# Patient Record
Sex: MALE | Race: WHITE | NOT HISPANIC OR LATINO | Employment: OTHER | URBAN - METROPOLITAN AREA
[De-identification: names, ages, dates, MRNs, and addresses within clinical notes are randomized per-mention and may not be internally consistent; named-entity substitution may affect disease eponyms.]

---

## 2018-11-11 ENCOUNTER — OFFICE VISIT (OUTPATIENT)
Dept: URGENT CARE | Facility: CLINIC | Age: 33
End: 2018-11-11
Payer: COMMERCIAL

## 2018-11-11 VITALS
HEART RATE: 89 BPM | SYSTOLIC BLOOD PRESSURE: 160 MMHG | OXYGEN SATURATION: 97 % | DIASTOLIC BLOOD PRESSURE: 96 MMHG | RESPIRATION RATE: 16 BRPM | TEMPERATURE: 97.1 F | BODY MASS INDEX: 23.1 KG/M2 | WEIGHT: 180 LBS | HEIGHT: 74 IN

## 2018-11-11 DIAGNOSIS — B35.6 TINEA CRURIS: Primary | ICD-10-CM

## 2018-11-11 PROCEDURE — 99283 EMERGENCY DEPT VISIT LOW MDM: CPT | Performed by: PHYSICIAN ASSISTANT

## 2018-11-11 PROCEDURE — G0382 LEV 3 HOSP TYPE B ED VISIT: HCPCS | Performed by: PHYSICIAN ASSISTANT

## 2018-11-11 RX ORDER — CEPHALEXIN 500 MG/1
500 CAPSULE ORAL
COMMUNITY
End: 2018-12-26 | Stop reason: HOSPADM

## 2018-11-11 RX ORDER — LABETALOL 300 MG/1
300 TABLET, FILM COATED ORAL DAILY
COMMUNITY

## 2018-11-11 NOTE — PROGRESS NOTES
NAME: Fausto Hernandez is a 35 y o  male  : 1985    MRN: [id number]      Assessment and Plan   Tinea cruris [B35 6]  1  Tinea cruris  terbinafine (LamISIL) 1 % cream           Patient Instructions   Patient Instructions   Use cream as directed  Keep area clean and covered as it is contagious  Follow-up with dermatologist if no improvement in 5 days      Proceed to ER if symptoms worsen  History of Present Illness     Patient with recent diagnosis of hidradenitis presents complaining of a new rash to the inside of his left groin  He reports he noticed this 2 days ago and states that it itches but does not hurt  He has not tried anything for it  He states he is currently on Keflex for his hidradenitis  Denies any new sexual partners  Review of Systems   Review of Systems   Constitutional: Negative for chills and fever  Skin: Positive for rash  Current Medications       Current Outpatient Prescriptions:     cephalexin (KEFLEX) 500 mg capsule, Take 500 mg by mouth 3 (three) times a day, Disp: , Rfl:     labetalol (NORMODYNE) 300 mg tablet, Take 300 mg by mouth daily, Disp: , Rfl:     LORazepam (ATIVAN) 0 5 mg tablet, Take 1 tablet by mouth 3 (three) times a day as needed (Tremors and anxiety due to alcohol withdrawal) for up to 3 days, Disp: 9 tablet, Rfl: 0    terbinafine (LamISIL) 1 % cream, Apply topically 2 (two) times a day for 7 days, Disp: 30 g, Rfl: 0    Current Allergies     Allergies as of 2018    (No Known Allergies)              Past Medical History:   Diagnosis Date    Alcohol abuse        No past surgical history on file  No family history on file  Medications have been verified      The following portions of the patient's history were reviewed and updated as appropriate: allergies, current medications, past family history, past medical history, past social history, past surgical history and problem list     Objective   /96   Pulse 89   Temp (!) 97 1 °F (36 2 °C)   Resp 16   Ht 6' 2" (1 88 m)   Wt 81 6 kg (180 lb)   SpO2 97%   BMI 23 11 kg/m²      Physical Exam     Physical Exam   Constitutional: He appears well-developed and well-nourished  No distress  Skin:   3 cm round discrete area of erythematous scaling ring with central clearing consistent with tinea corpus in the crease of the left groin with similar lesion on the opposite area of crease

## 2018-11-11 NOTE — PATIENT INSTRUCTIONS
Use cream as directed  Keep area clean and covered as it is contagious  Follow-up with dermatologist if no improvement in 5 days

## 2018-12-03 ENCOUNTER — OFFICE VISIT (OUTPATIENT)
Dept: SURGERY | Facility: HOSPITAL | Age: 33
End: 2018-12-03
Payer: COMMERCIAL

## 2018-12-03 VITALS
BODY MASS INDEX: 24.07 KG/M2 | WEIGHT: 187.6 LBS | HEART RATE: 73 BPM | DIASTOLIC BLOOD PRESSURE: 105 MMHG | TEMPERATURE: 98.1 F | SYSTOLIC BLOOD PRESSURE: 163 MMHG | HEIGHT: 74 IN

## 2018-12-03 DIAGNOSIS — D22.9 MULTIPLE PIGMENTED NEVI: ICD-10-CM

## 2018-12-03 DIAGNOSIS — F17.200 NICOTINE DEPENDENCE, UNCOMPLICATED, UNSPECIFIED NICOTINE PRODUCT TYPE: ICD-10-CM

## 2018-12-03 DIAGNOSIS — L72.3 SEBACEOUS CYST: ICD-10-CM

## 2018-12-03 DIAGNOSIS — Z71.6 ENCOUNTER FOR SMOKING CESSATION COUNSELING: ICD-10-CM

## 2018-12-03 DIAGNOSIS — K40.20 NON-RECURRENT BILATERAL INGUINAL HERNIA WITHOUT OBSTRUCTION OR GANGRENE: Primary | ICD-10-CM

## 2018-12-03 DIAGNOSIS — K42.9 UMBILICAL HERNIA WITHOUT OBSTRUCTION OR GANGRENE: ICD-10-CM

## 2018-12-03 PROCEDURE — 99244 OFF/OP CNSLTJ NEW/EST MOD 40: CPT | Performed by: SURGERY

## 2018-12-03 NOTE — LETTER
December 5, 2018     Ophelia Bingham[address] [facility]    Patient: Shaina Pavon   YOB: 1985   Date of Visit: 12/3/2018       Dear Dr Massey Co: Thank you for referring Shaina Pavon to me for evaluation  Below are my notes for this consultation  If you have questions, please do not hesitate to call me  I look forward to following your patient along with you  Sincerely,        Kayce Sparrow MD        CC: No Recipients  Dortha Sicard  12/3/2018  3:25 PM  Incomplete  Assessment/Plan: Shaina Pavon is a 35year old male who presents today, per referral by Dr Ophelia Bingham, regarding a possible umbilical hernia  Physical exam revealed a reducible umbilical hernia and reducible bilateral inguinal hernias  The right inguinal hernia is moderately-sized, while the left inguinal hernia is small  Discussed risks, benefits, and alternatives to laparoscopic repairs of these hernias versus open repair of just the umbilical hernia  Explained the surgeries as well as pre- and post-procedural protocols and restrictions  Lifting restrictions of no greater than 15 pounds and no strenuous activity for 2 weeks after the procedure  No heavy lifting of greater than 25 pounds for weeks 3 and 4  He would like to think about    Skin- Multiple pigmented nevi of back  Recommend he has his PCP monitor these annually  Physical exam also revealed two cysts of upper bilateral back measuring 1 x 2 cm  Smoking- Discussed benefits of smoking cessation  Explained smoking increases his risk of hernia recurrence  No problem-specific Assessment & Plan notes found for this encounter  There are no diagnoses linked to this encounter  Subjective:      Patient ID: Shaina Pavon is a 35 y o  male  Shaina Pavon is a 35year old male who presents today, per referral by Dr Ophelia iBngham, regarding a possible umbilical hernia   He says he noticed pain at his umbilicus for about a month  He says he has diarrhea every so often, but denies any nausea, vomiting, or constipation  He has not found any correlation between standing and the pain  He says he works as a   He says he smokes  The following portions of the patient's history were reviewed and updated as appropriate: allergies, current medications, past family history, past medical history, past social history, past surgical history and problem list     Review of Systems      Objective:      BP (!) 163/105   Pulse 73   Temp 98 1 °F (36 7 °C) (Tympanic)   Ht 6' 2" (1 88 m)   Wt 85 1 kg (187 lb 9 6 oz)   BMI 24 09 kg/m²           Physical Exam   Skin:   Multiple pigmented nevi of back  Two cysts of upper bilateral back measuring 1 x 2 cm  By signing my name below, I, Anastasia Buckner, attest that this documentation has been prepared under the direction and in the presence of Kel Ashton MD  Electronically Signed: Abram Sibley  12/3/18  Minerva Leroy, personally performed the services described in this documentation  All medical record entries made by the lisaibgeovanna were at my direction and in my presence  I have reviewed the chart and discharge instructions and agree that the record reflects my personal performance and is accurate and complete  Kel Ashton MD  12/3/18

## 2018-12-03 NOTE — PROGRESS NOTES
Assessment/Plan: Maryjane Rocha is a 35year old male who presents today, per referral by Dr Marlena Enriquez, regarding a possible umbilical hernia  Physical exam revealed a reducible umbilical hernia and reducible bilateral inguinal hernias  The right inguinal hernia is moderately-sized, while the left inguinal hernia is small  Discussed risks, benefits, and alternatives to laparoscopic repairs of these hernias versus open repair with mesh of just the umbilical hernia  Explained the surgeries as well as pre- and post-procedural protocols and restrictions  Lifting restrictions of no greater than 15 pounds and no strenuous activity for 2 weeks after the procedure  No heavy lifting of greater than 25 pounds for weeks 3 and 4  He would like to pursue laparoscopic repair of his hernias, but will call the office when he decides on a date for surgery  He knows to contact the office if any questions or concerns arise  Skin- Multiple pigmented nevi of back  Recommend he has his PCP monitor these annually  Physical exam also revealed two cysts of upper bilateral back measuring 1 x 2 cm  Smoking- Discussed benefits of smoking cessation  Explained smoking increases his risk of hernia recurrence  No problem-specific Assessment & Plan notes found for this encounter  There are no diagnoses linked to this encounter  Subjective:      Patient ID: Maryjane Rocha is a 35 y o  male  Maryjane Rocha is a 35year old male who presents today, per referral by Dr Marlena Enriquez, regarding a possible umbilical hernia  He says he noticed pain at his umbilicus for about a month  He says he has diarrhea every so often, but denies any nausea, vomiting, or constipation  He has not found any correlation between standing and the pain  He says he works as a   He says he smokes          The following portions of the patient's history were reviewed and updated as appropriate: allergies, current medications, past family history, past medical history, past social history, past surgical history and problem list     Review of Systems   Constitutional: Negative  HENT: Negative  Eyes: Negative  Respiratory: Negative  Cardiovascular: Negative  Gastrointestinal: Positive for abdominal pain and diarrhea  Negative for constipation, nausea and vomiting  Endocrine: Negative  Genitourinary: Negative  Musculoskeletal: Negative  Skin: Negative  Allergic/Immunologic: Negative  Neurological: Negative  Hematological: Negative  Psychiatric/Behavioral: Negative  All other systems reviewed and are negative  Objective:      BP (!) 163/105   Pulse 73   Temp 98 1 °F (36 7 °C) (Tympanic)   Ht 6' 2" (1 88 m)   Wt 85 1 kg (187 lb 9 6 oz)   BMI 24 09 kg/m²          Physical Exam   Constitutional: He is oriented to person, place, and time  He appears well-developed and well-nourished  No distress  HENT:   Head: Normocephalic and atraumatic  Right Ear: External ear normal    Left Ear: External ear normal    Nose: Nose normal    Mouth/Throat: Oropharynx is clear and moist  No oropharyngeal exudate  Eyes: Conjunctivae and EOM are normal  Right eye exhibits no discharge  Left eye exhibits no discharge  No scleral icterus  Neck: Normal range of motion  Neck supple  No JVD present  No tracheal deviation present  No thyromegaly present  Cardiovascular: Normal rate, regular rhythm, normal heart sounds and intact distal pulses  Exam reveals no gallop and no friction rub  No murmur heard  Pulmonary/Chest: Effort normal and breath sounds normal  No stridor  No respiratory distress  He has no wheezes  He has no rales  He exhibits no tenderness  Abdominal: Soft  Bowel sounds are normal  He exhibits no distension and no mass  There is no tenderness  There is no rebound and no guarding  Reducible umbilical hernia and reducible bilateral inguinal hernias   The right inguinal hernia is moderately-sized, while the left inguinal hernia is small  Musculoskeletal: Normal range of motion  He exhibits no edema, tenderness or deformity  Lymphadenopathy:     He has no cervical adenopathy  Neurological: He is alert and oriented to person, place, and time  No cranial nerve deficit  Coordination normal    Skin: Skin is dry  No rash noted  He is not diaphoretic  No erythema  No pallor  Multiple pigmented nevi of back  Two cysts of upper bilateral back measuring 1 x 2 cm  Psychiatric: He has a normal mood and affect  His behavior is normal  Thought content normal    Nursing note and vitals reviewed  By signing my name below, I, Anastasia Buckner, attest that this documentation has been prepared under the direction and in the presence of Kel Ashton MD  Electronically Signed: Ca Sibley  12/3/18  Minerva Leroy, personally performed the services described in this documentation  All medical record entries made by the ca were at my direction and in my presence  I have reviewed the chart and discharge instructions and agree that the record reflects my personal performance and is accurate and complete  Kel Ashton MD  12/3/18

## 2018-12-04 PROBLEM — K40.20 NON-RECURRENT BILATERAL INGUINAL HERNIA WITHOUT OBSTRUCTION OR GANGRENE: Status: ACTIVE | Noted: 2018-12-04

## 2018-12-04 PROBLEM — K42.9 UMBILICAL HERNIA WITHOUT OBSTRUCTION OR GANGRENE: Status: ACTIVE | Noted: 2018-12-04

## 2018-12-05 RX ORDER — CEFAZOLIN SODIUM 2 G/50ML
2000 SOLUTION INTRAVENOUS ONCE
Status: CANCELLED | OUTPATIENT
Start: 2018-12-26 | End: 2018-12-26

## 2018-12-05 RX ORDER — SODIUM CHLORIDE, SODIUM LACTATE, POTASSIUM CHLORIDE, CALCIUM CHLORIDE 600; 310; 30; 20 MG/100ML; MG/100ML; MG/100ML; MG/100ML
125 INJECTION, SOLUTION INTRAVENOUS CONTINUOUS
Status: CANCELLED | OUTPATIENT
Start: 2018-12-26

## 2018-12-19 ENCOUNTER — HOSPITAL ENCOUNTER (OUTPATIENT)
Dept: NON INVASIVE DIAGNOSTICS | Facility: HOSPITAL | Age: 33
Discharge: HOME | End: 2018-12-19
Attending: SURGERY
Payer: COMMERCIAL

## 2018-12-19 ENCOUNTER — APPOINTMENT (OUTPATIENT)
Dept: LAB | Facility: HOSPITAL | Age: 33
End: 2018-12-19
Attending: SURGERY
Payer: COMMERCIAL

## 2018-12-19 ENCOUNTER — TRANSCRIBE ORDERS (OUTPATIENT)
Dept: ADMINISTRATIVE | Facility: HOSPITAL | Age: 33
End: 2018-12-19

## 2018-12-19 DIAGNOSIS — Z01.818 ENCOUNTER FOR PREADMISSION TESTING: Primary | ICD-10-CM

## 2018-12-19 DIAGNOSIS — K40.20 NON-RECURRENT BILATERAL INGUINAL HERNIA WITHOUT OBSTRUCTION OR GANGRENE: ICD-10-CM

## 2018-12-19 DIAGNOSIS — Z01.818 ENCOUNTER FOR PREADMISSION TESTING: ICD-10-CM

## 2018-12-19 LAB
ANION GAP SERPL CALCULATED.3IONS-SCNC: 9 MMOL/L (ref 4–13)
BUN SERPL-MCNC: 21 MG/DL (ref 5–25)
CALCIUM SERPL-MCNC: 9.4 MG/DL (ref 8.3–10.1)
CHLORIDE SERPL-SCNC: 104 MMOL/L (ref 100–108)
CO2 SERPL-SCNC: 28 MMOL/L (ref 21–32)
CREAT SERPL-MCNC: 1.06 MG/DL (ref 0.6–1.3)
GFR SERPL CREATININE-BSD FRML MDRD: 92 ML/MIN/1.73SQ M
GLUCOSE P FAST SERPL-MCNC: 83 MG/DL (ref 65–99)
POTASSIUM SERPL-SCNC: 3.8 MMOL/L (ref 3.5–5.3)
SODIUM SERPL-SCNC: 141 MMOL/L (ref 136–145)

## 2018-12-19 PROCEDURE — 93005 ELECTROCARDIOGRAM TRACING: CPT

## 2018-12-19 PROCEDURE — 36415 COLL VENOUS BLD VENIPUNCTURE: CPT

## 2018-12-19 PROCEDURE — 80048 BASIC METABOLIC PNL TOTAL CA: CPT

## 2018-12-19 NOTE — PRE-PROCEDURE INSTRUCTIONS
Pre-Surgery Instructions:   Medication Instructions    labetalol (NORMODYNE) 300 mg tablet Instructed patient per Anesthesia Guidelines   LORazepam (ATIVAN) 0 5 mg tablet Instructed patient per Anesthesia Guidelines  Pre-op Showering Instructions for Surgery Patients    Before your operation, you play an important role in decreasing your risk for infection by washing with special antiseptic soap  This is an effective way to reduce bacteria on the skin which may help to prevent infections at the surgical site  Please read the following directions in advance  1  In the week before your operation, purchase a 4 ounce bottle of antiseptic soap containing chlorhexidine gluconate (CHG)  4%  Some brand names include: Aplicare®, Endure, and Hibiclens®  The cost is usually less than $5 00   For your convenience, the [address] [place] [address] carries the soap   It may also be available at your doctors office or pre-admission testing center, and at most retail pharmacies   If you are allergic or sensitive to soaps containing CHG, please let your doctor know so another antiseptic can be suggested   CHG antiseptic soap is for external use only  2  The day before your operation, follow these instructions carefully to get ready   Please clean linens (sheets) on your bed; you should sleep on clean sheets after your evening shower   Get clean towels and washcloth ready - you need enough for 2 showers   Set aside clean underwear, pajamas, and clothing to wear after the showers     Reminders:   DO NOT use any other soap or body rinse on your skin during or after the antiseptic showers   DO NOT use lotion, powder, deodorant, or perfume/aftershave of any kind on your skin after your antiseptic shower   DO NOT shave any body parts in the 24 hours/day before your operation   DO NOT get the antiseptic soap in your eyes, ears, nose, mouth, or vaginal area    3   You will need to shower the night before AND the morning of your surgery  Shower 1:   The first evening before the operation, take the first shower   First, shampoo your hair with regular shampoo and rinse it completely before you use the antiseptic soap  After washing and rinsing your hair, rinse your body   Next, use a clean washcloth to apply the antiseptic soap and wash your body from the neck down to your toes using ½ bottle of the antiseptic soap   You will use the other ½ bottle for the second shower   Clean the area where your incision will be; lather this area well for about 2 minutes   If you are having head or neck surgery, wash areas with the antiseptic soap   Rinse yourself completely with running water   Use a clean towel to dry off   Wear clean underwear and clothing/pajamas  Shower 2   The morning of your operation, take the second shower following the same steps as Shower 1 using the second ½ of the bottle of antiseptic soap   Use clean cloths and towels to wash and dry yourself   Wear clean underwear and clothing

## 2018-12-21 LAB
ATRIAL RATE: 75 BPM
P AXIS: 57 DEGREES
PR INTERVAL: 142 MS
QRS AXIS: 75 DEGREES
QRSD INTERVAL: 98 MS
QT INTERVAL: 384 MS
QTC INTERVAL: 428 MS
T WAVE AXIS: 40 DEGREES
VENTRICULAR RATE: 75 BPM

## 2018-12-21 PROCEDURE — 93010 ELECTROCARDIOGRAM REPORT: CPT | Performed by: INTERNAL MEDICINE

## 2018-12-26 ENCOUNTER — ANESTHESIA EVENT (OUTPATIENT)
Dept: PERIOP | Facility: HOSPITAL | Age: 33
End: 2018-12-26
Payer: COMMERCIAL

## 2018-12-26 ENCOUNTER — HOSPITAL ENCOUNTER (OUTPATIENT)
Facility: HOSPITAL | Age: 33
Setting detail: OUTPATIENT SURGERY
Discharge: HOME | End: 2018-12-26
Attending: SURGERY | Admitting: SURGERY
Payer: COMMERCIAL

## 2018-12-26 ENCOUNTER — ANESTHESIA (OUTPATIENT)
Dept: PERIOP | Facility: HOSPITAL | Age: 33
End: 2018-12-26
Payer: COMMERCIAL

## 2018-12-26 VITALS
TEMPERATURE: 97.5 F | RESPIRATION RATE: 18 BRPM | BODY MASS INDEX: 24.2 KG/M2 | OXYGEN SATURATION: 95 % | SYSTOLIC BLOOD PRESSURE: 135 MMHG | DIASTOLIC BLOOD PRESSURE: 90 MMHG | WEIGHT: 188.6 LBS | HEART RATE: 73 BPM | HEIGHT: 74 IN

## 2018-12-26 DIAGNOSIS — Z98.890 S/P HERNIA REPAIR: Primary | ICD-10-CM

## 2018-12-26 DIAGNOSIS — Z87.19 S/P HERNIA REPAIR: Primary | ICD-10-CM

## 2018-12-26 PROBLEM — K40.20 NON-RECURRENT BILATERAL INGUINAL HERNIA WITHOUT OBSTRUCTION OR GANGRENE: Status: RESOLVED | Noted: 2018-12-04 | Resolved: 2018-12-26

## 2018-12-26 PROBLEM — K42.9 UMBILICAL HERNIA WITHOUT OBSTRUCTION OR GANGRENE: Status: RESOLVED | Noted: 2018-12-04 | Resolved: 2018-12-26

## 2018-12-26 PROCEDURE — C1781 MESH (IMPLANTABLE): HCPCS | Performed by: SURGERY

## 2018-12-26 PROCEDURE — 49650 LAP ING HERNIA REPAIR INIT: CPT | Performed by: SURGERY

## 2018-12-26 PROCEDURE — 49650 LAP ING HERNIA REPAIR INIT: CPT | Performed by: PHYSICIAN ASSISTANT

## 2018-12-26 DEVICE — BARD 3DMAX MESH LEFT LARGE
Type: IMPLANTABLE DEVICE | Site: INGUINAL | Status: FUNCTIONAL
Brand: BARD 3DMAX MESH

## 2018-12-26 DEVICE — BARD 3DMAX MESH RIGHT LARGE
Type: IMPLANTABLE DEVICE | Site: INGUINAL | Status: FUNCTIONAL
Brand: BARD 3DMAX MESH

## 2018-12-26 RX ORDER — HYDROMORPHONE HCL/PF 1 MG/ML
0.5 SYRINGE (ML) INJECTION
Status: COMPLETED | OUTPATIENT
Start: 2018-12-26 | End: 2018-12-26

## 2018-12-26 RX ORDER — ROCURONIUM BROMIDE 10 MG/ML
INJECTION, SOLUTION INTRAVENOUS AS NEEDED
Status: DISCONTINUED | OUTPATIENT
Start: 2018-12-26 | End: 2018-12-26 | Stop reason: SURG

## 2018-12-26 RX ORDER — SODIUM CHLORIDE, SODIUM LACTATE, POTASSIUM CHLORIDE, CALCIUM CHLORIDE 600; 310; 30; 20 MG/100ML; MG/100ML; MG/100ML; MG/100ML
125 INJECTION, SOLUTION INTRAVENOUS CONTINUOUS
Status: DISCONTINUED | OUTPATIENT
Start: 2018-12-26 | End: 2018-12-26 | Stop reason: HOSPADM

## 2018-12-26 RX ORDER — FENTANYL CITRATE/PF 50 MCG/ML
25 SYRINGE (ML) INJECTION
Status: DISCONTINUED | OUTPATIENT
Start: 2018-12-26 | End: 2018-12-26 | Stop reason: HOSPADM

## 2018-12-26 RX ORDER — PROPOFOL 10 MG/ML
INJECTION, EMULSION INTRAVENOUS AS NEEDED
Status: DISCONTINUED | OUTPATIENT
Start: 2018-12-26 | End: 2018-12-26 | Stop reason: SURG

## 2018-12-26 RX ORDER — KETOROLAC TROMETHAMINE 30 MG/ML
INJECTION, SOLUTION INTRAMUSCULAR; INTRAVENOUS AS NEEDED
Status: DISCONTINUED | OUTPATIENT
Start: 2018-12-26 | End: 2018-12-26 | Stop reason: SURG

## 2018-12-26 RX ORDER — MAGNESIUM HYDROXIDE 1200 MG/15ML
LIQUID ORAL AS NEEDED
Status: DISCONTINUED | OUTPATIENT
Start: 2018-12-26 | End: 2018-12-26 | Stop reason: HOSPADM

## 2018-12-26 RX ORDER — ACETAMINOPHEN 325 MG/1
650 TABLET ORAL EVERY 4 HOURS PRN
Qty: 30 TABLET | Refills: 0 | Status: SHIPPED | OUTPATIENT
Start: 2018-12-26

## 2018-12-26 RX ORDER — MIDAZOLAM HYDROCHLORIDE 1 MG/ML
INJECTION INTRAMUSCULAR; INTRAVENOUS AS NEEDED
Status: DISCONTINUED | OUTPATIENT
Start: 2018-12-26 | End: 2018-12-26 | Stop reason: SURG

## 2018-12-26 RX ORDER — OXYCODONE HYDROCHLORIDE 5 MG/1
5 TABLET ORAL EVERY 4 HOURS PRN
Status: DISCONTINUED | OUTPATIENT
Start: 2018-12-26 | End: 2018-12-26 | Stop reason: HOSPADM

## 2018-12-26 RX ORDER — ONDANSETRON 2 MG/ML
INJECTION INTRAMUSCULAR; INTRAVENOUS AS NEEDED
Status: DISCONTINUED | OUTPATIENT
Start: 2018-12-26 | End: 2018-12-26 | Stop reason: SURG

## 2018-12-26 RX ORDER — HYDROMORPHONE HCL/PF 1 MG/ML
0.5 SYRINGE (ML) INJECTION
Status: DISCONTINUED | OUTPATIENT
Start: 2018-12-26 | End: 2018-12-26 | Stop reason: HOSPADM

## 2018-12-26 RX ORDER — ACETAMINOPHEN 325 MG/1
650 TABLET ORAL EVERY 6 HOURS PRN
Status: DISCONTINUED | OUTPATIENT
Start: 2018-12-26 | End: 2018-12-26 | Stop reason: HOSPADM

## 2018-12-26 RX ORDER — GLYCOPYRROLATE 0.2 MG/ML
INJECTION INTRAMUSCULAR; INTRAVENOUS AS NEEDED
Status: DISCONTINUED | OUTPATIENT
Start: 2018-12-26 | End: 2018-12-26 | Stop reason: SURG

## 2018-12-26 RX ORDER — FENTANYL CITRATE 50 UG/ML
INJECTION, SOLUTION INTRAMUSCULAR; INTRAVENOUS AS NEEDED
Status: DISCONTINUED | OUTPATIENT
Start: 2018-12-26 | End: 2018-12-26 | Stop reason: SURG

## 2018-12-26 RX ORDER — CEFAZOLIN SODIUM 2 G/50ML
2000 SOLUTION INTRAVENOUS ONCE
Status: COMPLETED | OUTPATIENT
Start: 2018-12-26 | End: 2018-12-26

## 2018-12-26 RX ORDER — ONDANSETRON 2 MG/ML
4 INJECTION INTRAMUSCULAR; INTRAVENOUS EVERY 6 HOURS PRN
Status: DISCONTINUED | OUTPATIENT
Start: 2018-12-26 | End: 2018-12-26 | Stop reason: HOSPADM

## 2018-12-26 RX ORDER — NEOSTIGMINE METHYLSULFATE 1 MG/ML
INJECTION INTRAVENOUS AS NEEDED
Status: DISCONTINUED | OUTPATIENT
Start: 2018-12-26 | End: 2018-12-26 | Stop reason: SURG

## 2018-12-26 RX ORDER — OXYCODONE HYDROCHLORIDE 5 MG/1
5 TABLET ORAL EVERY 4 HOURS PRN
Qty: 18 TABLET | Refills: 0 | Status: SHIPPED | OUTPATIENT
Start: 2018-12-26 | End: 2019-01-05

## 2018-12-26 RX ORDER — OXYCODONE HYDROCHLORIDE 5 MG/1
10 TABLET ORAL EVERY 4 HOURS PRN
Status: DISCONTINUED | OUTPATIENT
Start: 2018-12-26 | End: 2018-12-26 | Stop reason: HOSPADM

## 2018-12-26 RX ADMIN — FENTANYL CITRATE 50 MCG: 50 INJECTION, SOLUTION INTRAMUSCULAR; INTRAVENOUS at 09:40

## 2018-12-26 RX ADMIN — GLYCOPYRROLATE 0.2 MG: 0.2 INJECTION, SOLUTION INTRAMUSCULAR; INTRAVENOUS at 09:15

## 2018-12-26 RX ADMIN — ROCURONIUM BROMIDE 40 MG: 10 INJECTION, SOLUTION INTRAVENOUS at 09:20

## 2018-12-26 RX ADMIN — FENTANYL CITRATE 100 MCG: 50 INJECTION, SOLUTION INTRAMUSCULAR; INTRAVENOUS at 09:20

## 2018-12-26 RX ADMIN — ONDANSETRON 4 MG: 2 INJECTION, SOLUTION INTRAMUSCULAR; INTRAVENOUS at 10:10

## 2018-12-26 RX ADMIN — HYDROMORPHONE HYDROCHLORIDE 0.5 MG: 1 INJECTION, SOLUTION INTRAMUSCULAR; INTRAVENOUS; SUBCUTANEOUS at 11:38

## 2018-12-26 RX ADMIN — NEOSTIGMINE METHYLSULFATE 3 MG: 1 INJECTION INTRAVENOUS at 10:25

## 2018-12-26 RX ADMIN — CEFAZOLIN SODIUM 2000 MG: 2 SOLUTION INTRAVENOUS at 09:21

## 2018-12-26 RX ADMIN — PROPOFOL 200 MG: 10 INJECTION, EMULSION INTRAVENOUS at 09:20

## 2018-12-26 RX ADMIN — MIDAZOLAM HYDROCHLORIDE 2 MG: 1 INJECTION, SOLUTION INTRAMUSCULAR; INTRAVENOUS at 09:15

## 2018-12-26 RX ADMIN — FENTANYL CITRATE 25 MCG: 50 INJECTION, SOLUTION INTRAMUSCULAR; INTRAVENOUS at 11:07

## 2018-12-26 RX ADMIN — KETOROLAC TROMETHAMINE 15 MG: 30 INJECTION, SOLUTION INTRAMUSCULAR at 10:22

## 2018-12-26 RX ADMIN — HYDROMORPHONE HYDROCHLORIDE 0.5 MG: 1 INJECTION, SOLUTION INTRAMUSCULAR; INTRAVENOUS; SUBCUTANEOUS at 11:51

## 2018-12-26 RX ADMIN — GLYCOPYRROLATE 0.5 MG: 0.2 INJECTION, SOLUTION INTRAMUSCULAR; INTRAVENOUS at 10:25

## 2018-12-26 RX ADMIN — SODIUM CHLORIDE, SODIUM LACTATE, POTASSIUM CHLORIDE, AND CALCIUM CHLORIDE 125 ML/HR: .6; .31; .03; .02 INJECTION, SOLUTION INTRAVENOUS at 08:30

## 2018-12-26 RX ADMIN — FENTANYL CITRATE 25 MCG: 50 INJECTION, SOLUTION INTRAMUSCULAR; INTRAVENOUS at 10:57

## 2018-12-26 RX ADMIN — PROPOFOL 100 MG: 10 INJECTION, EMULSION INTRAVENOUS at 09:25

## 2018-12-26 RX ADMIN — ROCURONIUM BROMIDE 10 MG: 10 INJECTION, SOLUTION INTRAVENOUS at 09:40

## 2018-12-26 RX ADMIN — HYDROMORPHONE HYDROCHLORIDE 0.5 MG: 1 INJECTION, SOLUTION INTRAMUSCULAR; INTRAVENOUS; SUBCUTANEOUS at 11:20

## 2018-12-26 RX ADMIN — DEXAMETHASONE SODIUM PHOSPHATE 6 MG: 10 INJECTION INTRAMUSCULAR; INTRAVENOUS at 09:25

## 2018-12-26 RX ADMIN — OXYCODONE HYDROCHLORIDE 5 MG: 5 TABLET ORAL at 13:01

## 2018-12-26 RX ADMIN — HYDROMORPHONE HYDROCHLORIDE 0.5 MG: 1 INJECTION, SOLUTION INTRAMUSCULAR; INTRAVENOUS; SUBCUTANEOUS at 12:07

## 2018-12-26 RX ADMIN — OXYCODONE HYDROCHLORIDE 5 MG: 5 TABLET ORAL at 12:29

## 2018-12-26 RX ADMIN — FENTANYL CITRATE 50 MCG: 50 INJECTION, SOLUTION INTRAMUSCULAR; INTRAVENOUS at 10:20

## 2018-12-26 NOTE — ANESTHESIA PREPROCEDURE EVALUATION
Review of Systems/Medical History  Patient summary reviewed  Chart reviewed      Cardiovascular  EKG reviewed, Hypertension ,    Pulmonary       GI/Hepatic    GERD well controlled,        Negative  ROS        Endo/Other  Negative endo/other ROS      GYN  Negative gynecology ROS          Hematology  Negative hematology ROS      Musculoskeletal  Negative musculoskeletal ROS        Neurology  Negative neurology ROS      Psychology   Anxiety,              Physical Exam    Airway    Mallampati score: II  TM Distance: >3 FB  Neck ROM: full     Dental   No notable dental hx     Cardiovascular  Rhythm: regular, Rate: normal,     Pulmonary  Pulmonary exam normal Breath sounds clear to auscultation,     Other Findings        Anesthesia Plan  ASA Score- 2     Anesthesia Type- general with ASA Monitors  Additional Monitors:   Airway Plan: ETT  Comment: Benefits and risks of planned anesthetic discussed with patient, and agrees to proceed  I, Dr Odessa Pandya, the attending anesthesiologist, have personally seen and evaluated the patient prior to anesthetic care  I have reviewed the preanesthetic record, and other medical records if appropriate to the anesthetic care  If a CRNA is involved in the case, I have reviewed the CRNA assessment, if present, and agree  The patient is in a suitable condition to proceed with my formulated anesthetic plan        Plan Factors-  Patient did not smoke on day of surgery  Induction- intravenous  Postoperative Plan- Plan for postoperative opioid use  Informed Consent- Anesthetic plan and risks discussed with patient

## 2018-12-26 NOTE — ADDENDUM NOTE
Addendum  created 12/26/18 1051 by Veronica Brennan CRNA    Anesthesia Review and Sign - Ready for Procedure

## 2018-12-26 NOTE — OP NOTE
Inguinal Hernia, Laparocopic, Procedure Note    Name: Juliette Fletcher   : 1985  MRN: [id number]  Date: 2018    Indications: The patient presented with a history of a bilateral, reducible inguinal hernia and umbilical hernia    Pre-operative Diagnosis: bilateral reducible inguinal hernia and umbilical hernia    Post-operative Diagnosis: bilateral reducible direct and/or indirect inguinal hernia and umbilical hernia    Procedure: Laparoscopic repair of bilateral inguinal hernia with mesh, Laparoscopic assisted bilateral TAP block, open umbilical hernia repair (primary)    Surgeon: Hal Castañeda MD  Assistants: Emilee Fu PA-C, no qualified resident available  PA was medically necessary for the surgical safety of the case, including suturing, retraction, hemostasis  Anesthesia: General endotracheal anesthesia    Procedure Details   The patient was seen again in the Holding Room  The risks, benefits, complications, treatment options, and expected outcomes were discussed with the patient  The possibilities of reaction to medication, pulmonary aspiration, perforation of viscus, bleeding, postoperative short or long term nerve entrapment causing pain,recurrent infection, the need for additional procedures, and development of a complication requiring transfusion or further operation were discussed with the patient and/or family  There was concurrence with the proposed plan, and informed consent was obtained  The site of surgery was properly noted/marked  The patient was taken to the Operating Room, identified as Juliette Fletcher and the procedure verified as hernia repair  A Time Out was held and the above information confirmed  The patient was prepped and draped in a sterile fashion  A timeout was again performed  Local anesthesia was used in the incision   An umbilical incision was made and a Sunrise was passed around the umbilical stalk and this was  from underlying umbilical hernia sac using cautery  A maryellen trocar was inserted into the abdomen via the umbilical hernia defect and the abdomen was insufflated to appropriate pressure  Two additional 12mm trocars were placed lateral to rectus muscle approximately at the level of the umbilicus  At this point the patient was placed into Trendelenburg position and noted to have bilateral inguinal hernia   A laparoscopic assisted bilateral TAP block was performed using a combination of lidocaine and marcaine  The peritoneum was incised from the medial umbilical fold out laterally past the internal ring on the right  Using peanut the superior flap was dissected  Next the direct space was mobilized by exposing Guillermo's ligament all the way along its length to the pubic tubercle  If a direct hernia defect was seen this was dissected and reduced  If there was indirect hernia sac this was carefully mobilized off the cord structures with care to avoid injury to the gonadal vessels or spermatic cord  The remainder of the inferior flap was created  At this point Bard 3-D max mesh was selected and placed into the preperitoneal space  The mesh was secured inferiorly at Guillermo's  Medially at the pubic tubercle, and laterally at the anterior abdominal wall  Of note no clips were placed lateral to the gonadal vessels or inferior to the iliopubic tract  The peritoneum was closed using running V-lock suture  The peritoneum was incised from the medial umbilical fold out laterally past the internal ring on the left  Using peanut the superior flap was dissected  Next the direct space was mobilized by exposing Guillermo's ligament all the way along its length to the pubic tubercle  If a direct hernia defect was seen this was dissected and reduced  If there was indirect hernia sac this was carefully mobilized off the cord structures with care to avoid injury to the gonadal vessels or spermatic cord  The remainder of the inferior flap was created   At this point Bard 3-D max mesh was selected and placed into the preperitoneal space  The mesh was secured inferiorly at Guillermo's  Medially at the pubic tubercle, and laterally at the anterior abdominal wall  Of note no clips were placed lateral to the gonadal vessels or inferior to the iliopubic tract  The peritoneum was closed using running V-lock suture  The lateral trocar sites were closed with an 0-vicryl, transfascial suture passer and Shi closure device  The umbilical hernia site was closed with multiple figure of eight 2-0 Prolene sutures  The wound was closed in multiple layers using 3-0 Vicryl sutures and the skin of all ports were closed using a 4-0 Monocryl subcuticular stitch  The wound was dressed with Histacryl  The patient was anatomically correct at the end of the procedure  The patient tolerated the procedure in good condition  Instrument, sponge, and needle counts were correct prior to closure and at the conclusion of the case  This text is generated with voice recognition software  There may be translation, syntax,  or grammatical errors  If you have any questions, please contact the dictating provider  Findings: bilateral reducible direct and/or indirect inguinal hernia and umbilical hernia    Estimated Blood Loss: Minimal       Specimens: All specimens were sent for pathology  * No orders in the log *         Complications: None; patient tolerated the procedure well             Disposition: PACU            Condition: stable    Signature:   Tristan Brewer MD  Date: 12/26/2018 Time: 11:05 AM

## 2018-12-26 NOTE — H&P (VIEW-ONLY)
Assessment/Plan: Farhat Manzo is a 35year old male who presents today, per referral by Dr Mike Aburto, regarding a possible umbilical hernia  Physical exam revealed a reducible umbilical hernia and reducible bilateral inguinal hernias  The right inguinal hernia is moderately-sized, while the left inguinal hernia is small  Discussed risks, benefits, and alternatives to laparoscopic repairs of these hernias versus open repair with mesh of just the umbilical hernia  Explained the surgeries as well as pre- and post-procedural protocols and restrictions  Lifting restrictions of no greater than 15 pounds and no strenuous activity for 2 weeks after the procedure  No heavy lifting of greater than 25 pounds for weeks 3 and 4  He would like to pursue laparoscopic repair of his hernias, but will call the office when he decides on a date for surgery  He knows to contact the office if any questions or concerns arise  Skin- Multiple pigmented nevi of back  Recommend he has his PCP monitor these annually  Physical exam also revealed two cysts of upper bilateral back measuring 1 x 2 cm  Smoking- Discussed benefits of smoking cessation  Explained smoking increases his risk of hernia recurrence  No problem-specific Assessment & Plan notes found for this encounter  There are no diagnoses linked to this encounter  Subjective:      Patient ID: Farhat Manzo is a 35 y o  male  Farhat Manzo is a 35year old male who presents today, per referral by Dr Mike Aburto, regarding a possible umbilical hernia  He says he noticed pain at his umbilicus for about a month  He says he has diarrhea every so often, but denies any nausea, vomiting, or constipation  He has not found any correlation between standing and the pain  He says he works as a   He says he smokes          The following portions of the patient's history were reviewed and updated as appropriate: allergies, current medications, past family history, past medical history, past social history, past surgical history and problem list     Review of Systems   Constitutional: Negative  HENT: Negative  Eyes: Negative  Respiratory: Negative  Cardiovascular: Negative  Gastrointestinal: Positive for abdominal pain and diarrhea  Negative for constipation, nausea and vomiting  Endocrine: Negative  Genitourinary: Negative  Musculoskeletal: Negative  Skin: Negative  Allergic/Immunologic: Negative  Neurological: Negative  Hematological: Negative  Psychiatric/Behavioral: Negative  All other systems reviewed and are negative  Objective:      BP (!) 163/105   Pulse 73   Temp 98 1 °F (36 7 °C) (Tympanic)   Ht 6' 2" (1 88 m)   Wt 85 1 kg (187 lb 9 6 oz)   BMI 24 09 kg/m²          Physical Exam   Constitutional: He is oriented to person, place, and time  He appears well-developed and well-nourished  No distress  HENT:   Head: Normocephalic and atraumatic  Right Ear: External ear normal    Left Ear: External ear normal    Nose: Nose normal    Mouth/Throat: Oropharynx is clear and moist  No oropharyngeal exudate  Eyes: Conjunctivae and EOM are normal  Right eye exhibits no discharge  Left eye exhibits no discharge  No scleral icterus  Neck: Normal range of motion  Neck supple  No JVD present  No tracheal deviation present  No thyromegaly present  Cardiovascular: Normal rate, regular rhythm, normal heart sounds and intact distal pulses  Exam reveals no gallop and no friction rub  No murmur heard  Pulmonary/Chest: Effort normal and breath sounds normal  No stridor  No respiratory distress  He has no wheezes  He has no rales  He exhibits no tenderness  Abdominal: Soft  Bowel sounds are normal  He exhibits no distension and no mass  There is no tenderness  There is no rebound and no guarding  Reducible umbilical hernia and reducible bilateral inguinal hernias   The right inguinal hernia is moderately-sized, while the left inguinal hernia is small  Musculoskeletal: Normal range of motion  He exhibits no edema, tenderness or deformity  Lymphadenopathy:     He has no cervical adenopathy  Neurological: He is alert and oriented to person, place, and time  No cranial nerve deficit  Coordination normal    Skin: Skin is dry  No rash noted  He is not diaphoretic  No erythema  No pallor  Multiple pigmented nevi of back  Two cysts of upper bilateral back measuring 1 x 2 cm  Psychiatric: He has a normal mood and affect  His behavior is normal  Thought content normal    Nursing note and vitals reviewed  By signing my name below, I, Racheal Jimenez, attest that this documentation has been prepared under the direction and in the presence of Ene Riley MD  Electronically Signed: Abram Berry  12/3/18  [facility] personally performed the services described in this documentation  All medical record entries made by the lisaibe were at my direction and in my presence  I have reviewed the chart and discharge instructions and agree that the record reflects my personal performance and is accurate and complete  Ene Riley MD  12/3/18

## 2018-12-26 NOTE — DISCHARGE INSTRUCTIONS
Jose De Instructions      Dr Ariel RACHEL     1  General: [facility] will feel pulling sensations around the wound or funny aches and pains around the incisions  This is normal  Even minor surgery is a change in your body and this is your bodys way of reaction to it  If you have had abdominal surgery, it may help to support the incision with a small pillow or blanket for comfort when moving or coughing  2  Wound care:       Glue - Leave glue alone, it will fall off on its own, no need for an additional dressings    3  Water: You may shower over the wound, unless there are drain tubes left in place  Do not bathe or use a pool or hot tub until cleared by the physician  You may shower right over the staples, glue or Steri-Strips and rinse wound with soapy water but do not scrub incision pat dry when you are done  4  Activity: You may go up and down stairs, walk as much as you are comfortable, but walk at least 3 times each day  If you have had abdominal surgery, do not lift anything heavier than 15 pounds for at least 2 weeks, unless cleared by the doctor  5  Diet: You may resume a regular diet  If you had a same-day surgery or overnight stay surgery, you may wish to eat lightly for a few days: soups, crackers, and sandwiches  You may resume a regular diet when ready  6  Medications: Resume all of your previous medications, unless told otherwise by the doctor  Avoid aspirin or ibuprofen (Advil, Motrin, etc ) products for 2-3 days after the date of surgery  You may, at that time, began to take them again  Tylenol is always fine, unless you are taking any narcotic pain medication containing Tylenol (such as Percocet, Darvocet, Vicodin, or anything containing acetaminophen)  Do not take Tylenol if you're taking these medications  You do not need to take the narcotic pain medications unless you are having significant pain and discomfort      7  Driving: He will need someone to drive you home on the day of surgery  Do not drive or make any important decisions while on narcotic pain medication or 24 hours and after anesthesia or sedation for surgery  Generally, you may drive when your off all narcotic pain medications  8  Upset Stomach: You may take Maalox, Tums, or similar items for an upset stomach  If your narcotic pain medication causes an upset stomach, do not take it on an empty stomach  Try taking it with at least some crackers or toast      9  Constipation: Patients often experienced constipation after surgery  You may take over-the-counter medication for this, such as Metamucil, Senokot, Dulcolax, milk of magnesia, etc  You may take a suppository unless you have had anorectal surgery such as a procedure on your hemorrhoids  If you experience significant nausea or vomiting after abdominal surgery, call the office before trying any of these medications  10  Call the office: If you are experiencing any of the following, fevers above 101 5°, significant nausea or vomiting, if the wound develops drainage and/or is excessive redness around the wound, or if you have significant diarrhea or other worsening symptoms  11  Pain: You may be given a prescription for pain  This will be given to the hospital, the day of surgery  12  Sexual Activity: You may resume sexual activity when you feel ready and comfortable and your incision is sealed and healed without apparent infection risk  13  Urination: If you haven't urinated in 6 hours, go directly to the ER for evaluation for urinary retention  14  Follow-up in 2 weeks          [facility]  Phone: [phone]

## 2018-12-26 NOTE — INTERIM OP NOTE
REPAIR HERNIA INGUINAL, LAPAROSCOPIC, REPAIR HERNIA UMBILICAL  Postoperative Note  PATIENT NAME: Rica Santiago  : 1985  MRN: [id number]  [facility] OR ROOM 01    Surgery Date: 2018    Preop Diagnosis:  Non-recurrent bilateral inguinal hernia without obstruction or gangrene [C79 20]  Umbilical hernia without obstruction or gangrene [K42 9]    Post-Op Diagnosis Codes:     * Non-recurrent bilateral inguinal hernia without obstruction or gangrene [E24 57]     * Umbilical hernia without obstruction or gangrene [K42 9]    Procedure(s) (LRB):  REPAIR HERNIA INGUINAL, LAPAROSCOPIC (Bilateral)  REPAIR HERNIA UMBILICAL (N/A)    Surgeon(s) and Role:     * Gardiner Sicard, MD - Primary     * Lilia Fu PA-C - Assisting    Specimens:  * No specimens in log *    Estimated Blood Loss:   Minimal    Anesthesia Type:   General ETA    Findings:    as above  Complications:   None    Hernia Surgery Operative Note    Name: Rica Santiago    Gender: male    Age: 35 y o  Race:     BMI: Body mass index is 24 21 kg/m²      DIAGNOSIS: bilateral inguinal and umbilical hernias    Diabetes Mellitis: No    Coronary Heart Disease: No    Cancer: No    Steroid Use: No    Tobacco use: Yes   Last used: today   Type: cigarettes   Frequency (per day): 1 ppd   Duration (years): 10    Alcohol use: Yes Heavy    Location of Hernia: right indirect inguinal  Length:1 5 cm  Width:1 5 cm  Primary: Yes  Recurrent: No   Number of recurrences:    Access: Laparoscope    Component Separation: No    Mesh:   Yes -  Type: Synthetic   Right large 3D MAX    Location of Hernia: left indirect inguinal  Length:1 5 cm   Width:1 5 cm  Primary: Yes  Recurrent: No   Number of recurrences:    Access: Laparoscope    Component Separation: No    Mesh:   Yes -  Type: Synthetic   Left large 3D MAX    Location of Hernia: umbilical  EJHUSR:8 0 cm  Width:1 0 cm  Primary: Yes  Recurrent: No   Number of recurrences:    Access: Open    Component Separation: No    Mesh:   No      Operative Time: 52 min             SIGNATURE: Og Fu PA-C   DATE: December 26, 2018   TIME: 10:46 AM

## 2018-12-27 DIAGNOSIS — R11.0 NAUSEA: Primary | ICD-10-CM

## 2018-12-27 RX ORDER — PROMETHAZINE HYDROCHLORIDE 12.5 MG/1
12.5 TABLET ORAL EVERY 6 HOURS PRN
Qty: 30 TABLET | Refills: 0 | Status: SHIPPED | OUTPATIENT
Start: 2018-12-27

## 2018-12-28 ENCOUNTER — TELEPHONE (OUTPATIENT)
Dept: SURGERY | Facility: HOSPITAL | Age: 33
End: 2018-12-28

## 2018-12-28 DIAGNOSIS — Z98.890 POST-OPERATIVE STATE: Primary | ICD-10-CM

## 2018-12-28 RX ORDER — OXYCODONE HYDROCHLORIDE 5 MG/1
10 TABLET ORAL EVERY 4 HOURS PRN
Qty: 24 TABLET | Refills: 0 | Status: SHIPPED | OUTPATIENT
Start: 2018-12-28

## 2018-12-28 RX ORDER — OXYCODONE HYDROCHLORIDE 5 MG/1
5 TABLET ORAL EVERY 4 HOURS PRN
Qty: 18 TABLET | Refills: 0 | Status: CANCELLED | OUTPATIENT
Start: 2018-12-28

## 2018-12-28 NOTE — PROGRESS NOTES
Patient still with post op pain  Has used 3 day supply of narcotic medication  Will refill 3 day supply   Patient is aware he is to use tylenol and ibuprofen and should use narcotic for breakthrough pain      Susan Fu PA-C

## 2019-01-10 ENCOUNTER — OFFICE VISIT (OUTPATIENT)
Dept: SURGERY | Facility: HOSPITAL | Age: 34
End: 2019-01-10

## 2019-01-10 VITALS — HEIGHT: 74 IN | BODY MASS INDEX: 24.2 KG/M2 | WEIGHT: 188.6 LBS | TEMPERATURE: 97.6 F

## 2019-01-10 DIAGNOSIS — Z09 POSTOP CHECK: Primary | ICD-10-CM

## 2019-01-10 PROCEDURE — 99024 POSTOP FOLLOW-UP VISIT: CPT | Performed by: SURGERY

## 2019-01-10 NOTE — LETTER
January 10, 2019     [place] [address]  [facility]    Patient: Yoana Hernandez   YOB: 1985   Date of Visit: 1/10/2019       Dear Dr Klein Antis: Thank you for referring Yoana Hernandez to me for evaluation  Below are my notes for this consultation  If you have questions, please do not hesitate to call me  I look forward to following your patient along with you           Sincerely,        Debra Forde MD        CC: No Recipients

## 2019-01-10 NOTE — PROGRESS NOTES
Assessment/Plan: Zuleima Encinas is a 35year old male who presents today in post-operative state status post laparoscopic repair of bilateral inguinal hernias and umbilical hernia performed on 12/26/18  Physical exam revealed the incisions are healing well  Lifting restrictions of no greater than 25 pounds for 2 more weeks  He may follow up as needed  He knows to contact the office if any questions or concerns arise  Cysts of back- Discussed risks, benefits, and alternatives to excision of cysts in office  Explained the procedure as well as pre- and post-procedural protocols  He would like to have the cysts removed  He should contact the office when he would like to schedule the procedure  No problem-specific Assessment & Plan notes found for this encounter  There are no diagnoses linked to this encounter  Subjective:      Patient ID: Zuleima Encinas is a 35 y o  male  Zuleima Encinas is a 35year old male who presents today in post-operative state status post laparoscopic repair of bilateral inguinal hernias and umbilical hernia performed on 12/26/18  He reports he is doing well  The following portions of the patient's history were reviewed and updated as appropriate: allergies, current medications, past family history, past medical history, past social history, past surgical history and problem list     Review of Systems      Objective:      Temp 97 6 °F (36 4 °C) (Tympanic)   Ht 6' 2" (1 88 m)   Wt 85 5 kg (188 lb 9 6 oz)   BMI 24 21 kg/m²          Physical Exam   Abdominal:   Incisions are healing well  By signing my name below, I, Serge Mckee, attest that this documentation has been prepared under the direction and in the presence of Neida Urbano MD  Electronically Signed: Ca Benavidez  1/10/19  Bony Lozano, personally performed the services described in this documentation   All medical record entries made by the ca were at my direction and in my presence  I have reviewed the chart and discharge instructions and agree that the record reflects my personal performance and is accurate and complete  Ene Riley MD  1/10/19

## 2019-12-26 ENCOUNTER — OFFICE VISIT (OUTPATIENT)
Dept: URGENT CARE | Facility: CLINIC | Age: 34
End: 2019-12-26
Payer: COMMERCIAL

## 2019-12-26 VITALS
HEART RATE: 84 BPM | WEIGHT: 190 LBS | TEMPERATURE: 98.3 F | BODY MASS INDEX: 24.38 KG/M2 | OXYGEN SATURATION: 99 % | DIASTOLIC BLOOD PRESSURE: 100 MMHG | RESPIRATION RATE: 16 BRPM | HEIGHT: 74 IN | SYSTOLIC BLOOD PRESSURE: 150 MMHG

## 2019-12-26 DIAGNOSIS — L02.212 ABSCESS OF BACK: Primary | ICD-10-CM

## 2019-12-26 PROCEDURE — 99283 EMERGENCY DEPT VISIT LOW MDM: CPT | Performed by: FAMILY MEDICINE

## 2019-12-26 PROCEDURE — G0382 LEV 3 HOSP TYPE B ED VISIT: HCPCS | Performed by: FAMILY MEDICINE

## 2019-12-26 RX ORDER — SULFAMETHOXAZOLE AND TRIMETHOPRIM 800; 160 MG/1; MG/1
1 TABLET ORAL EVERY 12 HOURS SCHEDULED
Qty: 14 TABLET | Refills: 0 | Status: SHIPPED | OUTPATIENT
Start: 2019-12-26 | End: 2020-01-02

## 2019-12-26 NOTE — PROGRESS NOTES
NAME: Juliocesar Sheppard is a 29 y o  male  : 1985    MRN: [id number]      Assessment and Plan   Abscess of back [L02 212]  1  Abscess of back  sulfamethoxazole-trimethoprim (BACTRIM DS) 800-160 mg per tablet           Patient Instructions   Patient Instructions   F/u here as needed  F/u with PCP in 3-5 days  Begin bactrim      Proceed to ER if symptoms worsen  Chief Complaint     Chief Complaint   Patient presents with    Wound Infection     on back, pt reports having cysts on back that he was supposed to have removed but didn't, he feels one burst; approx dime size wound         History of Present Illness   Here c/o cyst eruption on his back  Painful, discharge  No fevers  Hurts to lay on it  Review of Systems   Review of Systems   Constitutional: Negative for fever  Respiratory: Negative for chest tightness and shortness of breath  Cardiovascular: Negative for chest pain  Gastrointestinal: Negative for abdominal pain, diarrhea, nausea and vomiting  Genitourinary: Negative for difficulty urinating and dysuria  Skin: Positive for wound  Negative for rash           Current Medications       Current Outpatient Medications:     acetaminophen (TYLENOL) 325 mg tablet, Take 2 tablets (650 mg total) by mouth every 4 (four) hours as needed for mild pain, Disp: 30 tablet, Rfl: 0    labetalol (NORMODYNE) 300 mg tablet, Take 300 mg by mouth daily, Disp: , Rfl:     LORazepam (ATIVAN) 0 5 mg tablet, Take 1 tablet by mouth 3 (three) times a day as needed (Tremors and anxiety due to alcohol withdrawal) for up to 3 days, Disp: 9 tablet, Rfl: 0    oxyCODONE (ROXICODONE) 5 mg immediate release tablet, Take 2 tablets (10 mg total) by mouth every 4 (four) hours as needed for moderate pain Max Daily Amount: 60 mg (Patient not taking: Reported on 1/10/2019 ), Disp: 24 tablet, Rfl: 0    promethazine (PHENERGAN) 12 5 MG tablet, Take 1 tablet (12 5 mg total) by mouth every 6 (six) hours as needed for nausea or vomiting (Patient not taking: Reported on 1/10/2019 ), Disp: 30 tablet, Rfl: 0    sulfamethoxazole-trimethoprim (BACTRIM DS) 800-160 mg per tablet, Take 1 tablet by mouth every 12 (twelve) hours for 7 days, Disp: 14 tablet, Rfl: 0    Current Allergies     Allergies as of 12/26/2019    (No Known Allergies)              Past Medical History:   Diagnosis Date    Alcohol abuse     Anxiety 02/06/2013    Essential (primary) hypertension     Hidradenitis suppurativa        Past Surgical History:   Procedure Laterality Date    FL LAP,INGUINAL HERNIA REPR,INITIAL Bilateral 12/26/2018    Procedure: REPAIR HERNIA INGUINAL, LAPAROSCOPIC;  Surgeon: Fredy Concepcion MD;  [facility];  Service: General    FL REPAIR UMBILICAL ONYE,1+I/S,QAHXL N/A 12/26/2018    Procedure: REPAIR HERNIA UMBILICAL;  Surgeon: Fredy Concepcion MD;  Location[facility];  Service: General       History reviewed  No pertinent family history  Medications have been verified  The following portions of the patient's history were reviewed and updated as appropriate: allergies, current medications, past family history, past medical history, past social history, past surgical history and problem list     Objective   /100   Pulse 84   Temp 98 3 °F (36 8 °C) (Tympanic)   Resp 16   Ht 6' 2" (1 88 m)   Wt 86 2 kg (190 lb)   SpO2 99%   BMI 24 39 kg/m²      Physical Exam     Physical Exam   Constitutional: He is oriented to person, place, and time  He appears well-developed and well-nourished  HENT:   Head: Normocephalic  Eyes: EOM are normal    Neck: Normal range of motion  Cardiovascular: Normal rate, regular rhythm and normal heart sounds  Exam reveals no gallop and no friction rub  No murmur heard  Pulmonary/Chest: Effort normal and breath sounds normal  No respiratory distress  He has no wheezes  He has no rales  Abdominal: Soft  Bowel sounds are normal  He exhibits no distension  There is no tenderness   There is no rebound and no guarding  Musculoskeletal: Normal range of motion  Neurological: He is oriented to person, place, and time  Skin: Skin is warm and dry  No rash noted  Upper back- TTP, erythema, small amount of sebaceous white material expressed  Cyst ruptured? Psychiatric: He has a normal mood and affect  Thought content normal    Nursing note and vitals reviewed

## 2020-06-07 ENCOUNTER — OFFICE VISIT (OUTPATIENT)
Dept: URGENT CARE | Facility: CLINIC | Age: 35
End: 2020-06-07
Payer: COMMERCIAL

## 2020-06-07 VITALS
HEART RATE: 120 BPM | OXYGEN SATURATION: 98 % | HEIGHT: 74 IN | DIASTOLIC BLOOD PRESSURE: 102 MMHG | BODY MASS INDEX: 24.38 KG/M2 | SYSTOLIC BLOOD PRESSURE: 166 MMHG | TEMPERATURE: 98.1 F | RESPIRATION RATE: 16 BRPM | WEIGHT: 190 LBS

## 2020-06-07 DIAGNOSIS — F41.9 ANXIETY: Primary | ICD-10-CM

## 2020-06-07 DIAGNOSIS — R09.89 THROAT TIGHTNESS: ICD-10-CM

## 2020-06-07 PROCEDURE — 99283 EMERGENCY DEPT VISIT LOW MDM: CPT | Performed by: PHYSICIAN ASSISTANT

## 2020-06-07 PROCEDURE — G0382 LEV 3 HOSP TYPE B ED VISIT: HCPCS | Performed by: PHYSICIAN ASSISTANT

## 2020-06-07 RX ORDER — LORAZEPAM 0.5 MG/1
0.5 TABLET ORAL EVERY 8 HOURS PRN
Qty: 9 TABLET | Refills: 0 | Status: SHIPPED | OUTPATIENT
Start: 2020-06-07 | End: 2020-06-10

## 2021-01-11 ENCOUNTER — TRANSCRIBE ORDERS (OUTPATIENT)
Dept: ADMINISTRATIVE | Facility: HOSPITAL | Age: 36
End: 2021-01-11

## 2021-01-11 DIAGNOSIS — I10 HYPERTENSION, ESSENTIAL: Primary | ICD-10-CM

## 2021-01-15 ENCOUNTER — HOSPITAL ENCOUNTER (OUTPATIENT)
Dept: NUCLEAR MEDICINE | Facility: HOSPITAL | Age: 36
Discharge: HOME | End: 2021-01-15
Payer: COMMERCIAL

## 2021-01-15 DIAGNOSIS — I10 HYPERTENSION, ESSENTIAL: ICD-10-CM

## 2021-01-15 PROCEDURE — 78707 K FLOW/FUNCT IMAGE W/O DRUG: CPT

## 2021-01-15 PROCEDURE — A9562 TC99M MERTIATIDE: HCPCS

## 2021-01-15 PROCEDURE — G1004 CDSM NDSC: HCPCS

## 2021-01-15 RX ORDER — CAPTOPRIL 25 MG/1
50 TABLET ORAL
Status: COMPLETED | OUTPATIENT
Start: 2021-01-15 | End: 2021-01-15

## 2021-01-15 RX ADMIN — CAPTOPRIL 50 MG: 25 TABLET ORAL at 12:21

## 2022-01-24 ENCOUNTER — HOSPITAL ENCOUNTER (EMERGENCY)
Facility: HOSPITAL | Age: 37
Discharge: HOME | End: 2022-01-24
Attending: EMERGENCY MEDICINE
Payer: COMMERCIAL

## 2022-01-24 ENCOUNTER — APPOINTMENT (EMERGENCY)
Dept: RADIOLOGY | Facility: HOSPITAL | Age: 37
End: 2022-01-24
Payer: COMMERCIAL

## 2022-01-24 VITALS
RESPIRATION RATE: 20 BRPM | HEART RATE: 99 BPM | BODY MASS INDEX: 25.03 KG/M2 | SYSTOLIC BLOOD PRESSURE: 153 MMHG | WEIGHT: 195 LBS | DIASTOLIC BLOOD PRESSURE: 99 MMHG | OXYGEN SATURATION: 99 % | TEMPERATURE: 98.2 F | HEIGHT: 74 IN

## 2022-01-24 DIAGNOSIS — R07.89 RIGHT-SIDED CHEST WALL PAIN: Primary | ICD-10-CM

## 2022-01-24 PROCEDURE — 99283 EMERGENCY DEPT VISIT LOW MDM: CPT

## 2022-01-24 PROCEDURE — 71101 X-RAY EXAM UNILAT RIBS/CHEST: CPT

## 2022-01-24 PROCEDURE — 96372 THER/PROPH/DIAG INJ SC/IM: CPT

## 2022-01-24 PROCEDURE — 99284 EMERGENCY DEPT VISIT MOD MDM: CPT | Performed by: EMERGENCY MEDICINE

## 2022-01-24 RX ORDER — KETOROLAC TROMETHAMINE 30 MG/ML
30 INJECTION, SOLUTION INTRAMUSCULAR; INTRAVENOUS ONCE
Status: COMPLETED | OUTPATIENT
Start: 2022-01-24 | End: 2022-01-24

## 2022-01-24 RX ORDER — NAPROXEN 500 MG/1
500 TABLET ORAL 2 TIMES DAILY WITH MEALS
Qty: 20 TABLET | Refills: 0 | Status: SHIPPED | OUTPATIENT
Start: 2022-01-24

## 2022-01-24 RX ADMIN — KETOROLAC TROMETHAMINE 30 MG: 30 INJECTION, SOLUTION INTRAMUSCULAR; INTRAVENOUS at 16:43

## 2022-01-24 NOTE — ED NOTES
Pt A,A,Ox3 at time of discharge   Pt ambulatory without complication  Pt given discharge instructions by Dr Andrew Kenyon  Denies difficulty breathing or SOB  Pt understands reasons to return to ED and to follow up with PIERRE Nielsen RN  01/24/22 [id number]

## 2022-01-24 NOTE — ED PROVIDER NOTES
History  Chief Complaint   Patient presents with    Rib Pain     c/o slipped down 6 wooden steps 01/22 came to ED for persistent pain to R ribs  Denies any follow up  Denies blood thinners, denies LOC or striking head  This is 27-year-old male who states he fell down 6 wooden steps 2 days ago has right-sided rib pain denies any head injury or loss of consciousness there is a contusion to the lateral right rib area no abdominal tenderness on examination and bedside fast exam is negative for blood  History provided by:  Patient  Medical Problem  Location:  Right rib  Quality:  Sharp pain  Severity:  Moderate  Onset quality:  Sudden  Duration:  2 days  Timing:  Constant  Progression:  Worsening  Chronicity:  New  Context:  Right rib pain after falling down steps 2 days ago  Relieved by:  Nothing  Worsened by: Movement and palpation      Prior to Admission Medications   Prescriptions Last Dose Informant Patient Reported? Taking?    LORazepam (ATIVAN) 0 5 mg tablet   No No   Sig: Take 1 tablet (0 5 mg total) by mouth every 8 (eight) hours as needed for anxiety for up to 3 days   acetaminophen (TYLENOL) 325 mg tablet   No No   Sig: Take 2 tablets (650 mg total) by mouth every 4 (four) hours as needed for mild pain   Patient not taking: Reported on 6/7/2020   labetalol (NORMODYNE) 300 mg tablet   Yes No   Sig: Take 300 mg by mouth daily   oxyCODONE (ROXICODONE) 5 mg immediate release tablet   No No   Sig: Take 2 tablets (10 mg total) by mouth every 4 (four) hours as needed for moderate pain Max Daily Amount: 60 mg   Patient not taking: Reported on 1/10/2019    promethazine (PHENERGAN) 12 5 MG tablet   No No   Sig: Take 1 tablet (12 5 mg total) by mouth every 6 (six) hours as needed for nausea or vomiting   Patient not taking: Reported on 1/10/2019       Facility-Administered Medications: None       Past Medical History:   Diagnosis Date    Alcohol abuse     Anxiety 02/06/2013    Essential (primary) hypertension     Hidradenitis suppurativa        Past Surgical History:   Procedure Laterality Date    AR LAP,INGUINAL HERNIA REPR,INITIAL Bilateral 12/26/2018    Procedure: REPAIR HERNIA INGUINAL, LAPAROSCOPIC;  Surgeon: Homar Freeman MD;  Location:  MAIN OR;  Service: General    AR REPAIR UMBILICAL KHDN,4+S/L,QMZHJ N/A 12/26/2018    Procedure: REPAIR HERNIA UMBILICAL;  Surgeon: Homar Freeman MD;  Location:  MAIN OR;  Service: General       History reviewed  No pertinent family history  I have reviewed and agree with the history as documented  E-Cigarette/Vaping    E-Cigarette Use Never User      E-Cigarette/Vaping Substances     Social History     Tobacco Use    Smoking status: Current Every Day Smoker     Packs/day: 0 50     Years: 15 00     Pack years: 7 50     Types: Cigarettes    Smokeless tobacco: Never Used   Vaping Use    Vaping Use: Never used   Substance Use Topics    Alcohol use: Yes     Comment: daily    Drug use: No       Review of Systems   Musculoskeletal:        Right rib pain  All other systems reviewed and are negative  Physical Exam  Physical Exam  Vitals and nursing note reviewed  Constitutional:       General: He is not in acute distress  Appearance: He is not ill-appearing, toxic-appearing or diaphoretic  HENT:      Head: Normocephalic and atraumatic  Right Ear: External ear normal       Left Ear: External ear normal       Nose: Nose normal    Eyes:      General:         Right eye: No discharge  Left eye: No discharge  Extraocular Movements: Extraocular movements intact  Pupils: Pupils are equal, round, and reactive to light  Cardiovascular:      Rate and Rhythm: Normal rate and regular rhythm  Pulses: Normal pulses  Heart sounds: Normal heart sounds  No murmur heard  No friction rub  No gallop  Pulmonary:      Effort: Pulmonary effort is normal  No respiratory distress        Breath sounds: No stridor (Right lateral rib air)  No wheezing, rhonchi or rales  Chest:      Chest wall: Tenderness present  Abdominal:      General: There is no distension  Palpations: Abdomen is soft  Tenderness: There is no abdominal tenderness  There is no guarding or rebound  Comments: Bedside fast exam does not show any intra-abdominal fluid   Musculoskeletal:         General: Tenderness and signs of injury present  Cervical back: Normal range of motion and neck supple  No rigidity or tenderness  Right lower leg: No edema  Left lower leg: No edema  Comments: Contusion to the right mid lateral rib area   Skin:     General: Skin is warm and dry  Findings: Bruising present  Neurological:      General: No focal deficit present  Mental Status: He is alert and oriented to person, place, and time  Cranial Nerves: No cranial nerve deficit  Sensory: No sensory deficit  Motor: No weakness  Coordination: Coordination normal    Psychiatric:         Mood and Affect: Mood normal          Behavior: Behavior normal          Thought Content:  Thought content normal          Vital Signs  ED Triage Vitals [01/24/22 1429]   Temperature Pulse Respirations Blood Pressure SpO2   98 2 °F (36 8 °C) (!) 125 22 (!) 177/110 99 %      Temp Source Heart Rate Source Patient Position - Orthostatic VS BP Location FiO2 (%)   Temporal Monitor Sitting Left arm --      Pain Score       10 - Worst Possible Pain           Vitals:    01/24/22 1429 01/24/22 1433 01/24/22 1645   BP: (!) 177/110 (!) 189/104 153/99   Pulse: (!) 125 (!) 107 99   Patient Position - Orthostatic VS: Sitting Sitting Sitting         Visual Acuity      ED Medications  Medications   ketorolac (TORADOL) injection 30 mg (30 mg Intramuscular Given 1/24/22 1643)       Diagnostic Studies  Results Reviewed     None                 XR ribs with pa chest min 3 views RIGHT   Final Result by Pal Trinidad DO (01/24 1535)      No acute cardiopulmonary disease  No acute displaced rib fractures  Workstation performed: [id number]                    Procedures  Procedures         ED Course  ED Course as of 01/24/22 1707   Mon Jan 24, 2022   1632 Bedside fast exam does not show any blood in Morison's pouch suprapubic or left upper quadrant areas                                             MDM    Disposition  Final diagnoses:   Right-sided chest wall pain - With rib contusion     Time reflects when diagnosis was documented in both MDM as applicable and the Disposition within this note     Time User Action Codes Description Comment    1/24/2022  4:33 PM Awilda Acosta Add [R07 89] Right-sided chest wall pain     1/24/2022  4:33 PM Awilda Nunnery Modify [R07 89] Right-sided chest wall pain With rib contusion      ED Disposition     ED Disposition Condition Date/Time Comment    Discharge Stable Mon Jan 24, 2022  4:33 PM Juliette Fletcher discharge to home/self care              Follow-up Information     Follow up With Specialties Details Why Contact Info Additional [address], DO Internal Medicine   [address] [facility],[address]  [address] Emergency Department Emergency Medicine  As needed, If symptoms worsen [address] [address]  [address] [facility] Emergency Department, [address] [place], [facility] [address]          Discharge Medication List as of 1/24/2022  4:34 PM      START taking these medications    Details   naproxen (Naprosyn) 500 mg tablet Take 1 tablet (500 mg total) by mouth 2 (two) times a day with meals, Starting Mon 1/24/2022, Normal         CONTINUE these medications which have NOT CHANGED    Details   acetaminophen (TYLENOL) 325 mg tablet Take 2 tablets (650 mg total) by mouth every 4 (four) hours as needed for mild pain, Starting Wed 12/26/2018, Print      labetalol (NORMODYNE) 300 mg tablet Take 300 mg by mouth daily, Historical Med      LORazepam (ATIVAN) 0 5 mg tablet Take 1 tablet (0 5 mg total) by mouth every 8 (eight) hours as needed for anxiety for up to 3 days, Starting Sun 6/7/2020, Until Wed 6/10/2020, Normal      oxyCODONE (ROXICODONE) 5 mg immediate release tablet Take 2 tablets (10 mg total) by mouth every 4 (four) hours as needed for moderate pain Max Daily Amount: 60 mg, Starting Fri 12/28/2018, Normal      promethazine (PHENERGAN) 12 5 MG tablet Take 1 tablet (12 5 mg total) by mouth every 6 (six) hours as needed for nausea or vomiting, Starting u 12/27/2018, Normal             No discharge procedures on file      PDMP Review     None          ED Provider  Electronically Signed by           Carson Robles DO  01/24/22 [id number]

## 2023-07-26 NOTE — TELEPHONE ENCOUNTER
Spoke to patient  Stated he is still in extreme pain  Told patient I would contact surgeon  Is also complaining of constipation  Recommended milk of magnesia, hydration and walking to aid in constipation relief    Forwarded message to Wu Mccall  Simple: Patient demonstrates quick and easy understanding/Verbalized Understanding

## (undated) DEVICE — SUT MONOCRYL 4-0 PS-2 27 IN Y426H

## (undated) DEVICE — GLOVE INDICATOR PI UNDERGLOVE SZ 8 BLUE

## (undated) DEVICE — BLUE HEAT SCOPE WARMER

## (undated) DEVICE — SYRINGE CATH TIP 50ML

## (undated) DEVICE — TROCAR: Brand: KII® SLEEVE

## (undated) DEVICE — ABSORBABLE WOUND CLOSURE DEVICE: Brand: V-LOC 90

## (undated) DEVICE — ALLENTOWN LAP CHOLE APP PACK: Brand: CARDINAL HEALTH

## (undated) DEVICE — SCD SEQUENTIAL COMPRESSION COMFORT SLEEVE MEDIUM KNEE LENGTH: Brand: KENDALL SCD

## (undated) DEVICE — DRAPE EQUIPMENT RF WAND

## (undated) DEVICE — SURGICAL CLIPPER BLADE GENERAL USE

## (undated) DEVICE — TRAY FOLEY 16FR URIMETER SURESTEP

## (undated) DEVICE — CHLORAPREP HI-LITE 26ML ORANGE

## (undated) DEVICE — TROCARS: Brand: KII® BALLOON BLUNT TIP SYSTEM

## (undated) DEVICE — 2000CC GUARDIAN II: Brand: GUARDIAN

## (undated) DEVICE — ELECTRODE BLADE MOD E-Z CLEAN 2.5IN 6.4CM -0012M

## (undated) DEVICE — GLOVE SRG BIOGEL ECLIPSE 8

## (undated) DEVICE — GLOVE INDICATOR PI UNDERGLOVE SZ 6.5 BLUE

## (undated) DEVICE — SUT PROLENE 2-0 CT-2 30 IN 8411H

## (undated) DEVICE — ADHESIVE SKN CLSR HISTOACRYL FLEX 0.5ML LF

## (undated) DEVICE — PAD GROUNDING ADULT

## (undated) DEVICE — INTENDED FOR TISSUE SEPARATION, AND OTHER PROCEDURES THAT REQUIRE A SHARP SURGICAL BLADE TO PUNCTURE OR CUT.: Brand: BARD-PARKER SAFETY BLADES SIZE 11, STERILE

## (undated) DEVICE — ENDOPATH 5MM CURVED SCISSORS WITH MONOPOLAR CAUTERY: Brand: ENDOPATH

## (undated) DEVICE — STAPLER ENDO HERNIA 4.0 X 12MM

## (undated) DEVICE — SUT VICRYL 0 UR-6 27 IN J603H

## (undated) DEVICE — ENDOPATH 5MM ENDOSCOPIC BLUNT TIP DISSECTORS (12 POUCHES CONTAINING 3 DISSECTORS EACH): Brand: ENDOPATH

## (undated) DEVICE — 3M™ TEGADERM™ TRANSPARENT FILM DRESSING FRAME STYLE, 1624W, 2-3/8 IN X 2-3/4 IN (6 CM X 7 CM), 100/CT 4CT/CASE: Brand: 3M™ TEGADERM™

## (undated) DEVICE — TROCAR: Brand: KII FIOS FIRST ENTRY

## (undated) DEVICE — GLOVE SRG BIOGEL 6.5

## (undated) DEVICE — SUT VICRYL 3-0 SH 27 IN J416H

## (undated) DEVICE — SPONGE 4 X 4 XRAY 16 PLY STRL LF RFD